# Patient Record
Sex: FEMALE | Race: AMERICAN INDIAN OR ALASKA NATIVE
[De-identification: names, ages, dates, MRNs, and addresses within clinical notes are randomized per-mention and may not be internally consistent; named-entity substitution may affect disease eponyms.]

---
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
## 2018-04-10 ENCOUNTER — HOSPITAL ENCOUNTER (EMERGENCY)
Dept: HOSPITAL 42 - ED | Age: 18
Discharge: LEFT BEFORE DISCHARGE | End: 2018-04-10
Payer: MEDICAID

## 2018-04-10 VITALS
RESPIRATION RATE: 16 BRPM | DIASTOLIC BLOOD PRESSURE: 76 MMHG | HEART RATE: 79 BPM | TEMPERATURE: 99 F | OXYGEN SATURATION: 99 % | SYSTOLIC BLOOD PRESSURE: 115 MMHG

## 2018-04-10 VITALS — BODY MASS INDEX: 29.5 KG/M2

## 2018-04-10 DIAGNOSIS — M54.5: Primary | ICD-10-CM

## 2018-07-07 ENCOUNTER — HOSPITAL ENCOUNTER (EMERGENCY)
Dept: HOSPITAL 42 - ED | Age: 18
Discharge: HOME | End: 2018-07-07
Payer: MEDICAID

## 2018-07-07 VITALS
DIASTOLIC BLOOD PRESSURE: 79 MMHG | RESPIRATION RATE: 12 BRPM | SYSTOLIC BLOOD PRESSURE: 113 MMHG | HEART RATE: 70 BPM | OXYGEN SATURATION: 99 %

## 2018-07-07 VITALS — BODY MASS INDEX: 29.5 KG/M2

## 2018-07-07 VITALS — TEMPERATURE: 97.9 F

## 2018-07-07 DIAGNOSIS — K29.70: Primary | ICD-10-CM

## 2018-07-07 LAB
ALBUMIN SERPL-MCNC: 4.5 G/DL
ALBUMIN/GLOB SERPL: 1.3 {RATIO}
ALT SERPL-CCNC: 26 U/L
APPEARANCE UR: (no result)
AST SERPL-CCNC: 23 U/L
BASOPHILS # BLD AUTO: 0.01 K/MM3
BASOPHILS NFR BLD: 0.2 %
BILIRUB UR-MCNC: NEGATIVE MG/DL
BUN SERPL-MCNC: 9 MG/DL
CALCIUM SERPL-MCNC: 9.2 MG/DL
COLOR UR: YELLOW
EOSINOPHIL # BLD: 0 10*3/UL
EOSINOPHIL NFR BLD: 0.4 %
ERYTHROCYTE [DISTWIDTH] IN BLOOD BY AUTOMATED COUNT: 12.8 %
GFR NON-AFRICAN AMERICAN: > 60
GLUCOSE UR STRIP-MCNC: NEGATIVE MG/DL
GRANULOCYTES # BLD: 3.52 10*3/UL
GRANULOCYTES NFR BLD: 68 %
HGB BLD-MCNC: 13.1 G/DL
LEUKOCYTE ESTERASE UR-ACNC: NEGATIVE LEU/UL
LIPASE SERPL-CCNC: 77 U/L
LYMPHOCYTES # BLD: 1.4 10*3/UL
LYMPHOCYTES NFR BLD AUTO: 26.4 %
MCH RBC QN AUTO: 29.4 PG
MCHC RBC AUTO-ENTMCNC: 34.4 G/DL
MCV RBC AUTO: 85.4 FL
MONOCYTES # BLD AUTO: 0.3 10*3/UL
MONOCYTES NFR BLD: 5 %
PH UR STRIP: 6 [PH]
PLATELET # BLD: 229 10^3/UL
PMV BLD AUTO: 11.1 FL
PROT UR STRIP-MCNC: NEGATIVE MG/DL
RBC # BLD AUTO: 4.46 10^6/UL
RBC # UR STRIP: NEGATIVE /UL
SP GR UR STRIP: >= 1.03
UROBILINOGEN UR STRIP-ACNC: 0.2 E.U./DL
WBC # BLD AUTO: 5.2 10^3/UL

## 2018-07-07 PROCEDURE — 83690 ASSAY OF LIPASE: CPT

## 2018-07-07 PROCEDURE — 87086 URINE CULTURE/COLONY COUNT: CPT

## 2018-07-07 PROCEDURE — 80053 COMPREHEN METABOLIC PANEL: CPT

## 2018-07-07 PROCEDURE — 83735 ASSAY OF MAGNESIUM: CPT

## 2018-07-07 PROCEDURE — 85025 COMPLETE CBC W/AUTO DIFF WBC: CPT

## 2018-07-07 PROCEDURE — 96374 THER/PROPH/DIAG INJ IV PUSH: CPT

## 2018-07-07 PROCEDURE — 96375 TX/PRO/DX INJ NEW DRUG ADDON: CPT

## 2018-07-07 PROCEDURE — 99283 EMERGENCY DEPT VISIT LOW MDM: CPT

## 2018-07-07 PROCEDURE — 81003 URINALYSIS AUTO W/O SCOPE: CPT

## 2018-07-07 NOTE — ED PDOC
Arrival/HPI





- General


Historian: Patient





- History of Present Illness


Time/Duration: 1-3 hours


Symptom Onset: Sudden


Activities at Onset: Rest


Context: Sitting





<Siddharth Branch - Last Filed: 18 06:00>





<Khurram Pro DO - Last Filed: 18 06:05>





- General


Chief Complaint: Abdominal Pain


Time Seen by Provider: 18 04:29





- History of Present Illness


Narrative History of Present Illness (Text): 





18 05:02





This is a 17 year old female with a PMH of , appendix removal, and IUD 

removal presents to the ER with abdominal pain that began a few hours ago after 

dinner. Patient was relaxing with friends when abdominal pain started and 

subsequently vomited several times and had diarrhea. Vomit and diarrhea were 

non bloody and non bilious. Patient states pain is similar to when she had 

appendicitis in . LMP was  and periods are regular. Patient admits 

to SOB, abdominal pain, vomiting, and diarrhea. Patient denies vaginal discharge

, vaginal bleeding, chest pain, dizziness, fevers and chills. PMD is Dr. Aguirre. 


 (Siddharth Branch)





Past Medical History





- Provider Review


Nursing Documentation Reviewed: Yes





- Past History


Past History: No Previous





- Cardiac


Hx Cardiac Disorders: No





- Pulmonary


Hx Respiratory Disorders: No





- Neurological


Hx Neurological Disorder: No





- HEENT


Hx HEENT Disorder: No





- Renal


Hx Renal Disorder: No





- Endocrine/Metabolic


Hx Endocrine Disorders: No





- Hematological/Oncological


Hx Blood Disorders: No





- Integumentary


Hx Dermatological Disorder: No





- Musculoskeletal/Rheumatological


Hx Musculoskeletal Disorders: No





- Gastrointestinal


Hx Gastrointestinal Disorders: No





- Genitourinary/Gynecological


Hx Genitourinary Disorders: No





- Psychiatric


Hx Psychophysiologic Disorder: No


Hx Substance Use: No





- Surgical History


Hx Appendectomy: Yes


Hx  Section: Yes


Other/Comment: hernia





<Siddharth Branch - Last Filed: 18 06:00>





Family/Social History





- Physician Review


Nursing Documentation Reviewed: Yes


Family/Social History: Unknown Family HX


Smoking Status: Never Smoked


Hx Alcohol Use: No


Hx Substance Use: No





<Siddharth Branch - Last Filed: 18 06:00>





Allergies/Home Meds





<Siddharth Branch - Last Filed: 18 06:00>





<Khurram Pro DO - Last Filed: 18 06:05>


Allergies/Adverse Reactions: 


Allergies





No Known Allergies Allergy (Verified 04/10/18 17:19)


 











Review of Systems





- Physician Review


All systems were reviewed & negative as marked: Yes





- Review of Systems


Constitutional: Normal.  absent: Fevers


Eyes: Normal


ENT: Normal


Respiratory: SOB.  absent: Cough, Wheezing


Cardiovascular: Normal.  absent: Chest Pain, Palpitations


Gastrointestinal: Abdominal Pain, Diarrhea, Nausea, Vomiting.  absent: 

Hematochezia, Hematemesis


Genitourinary Female: Normal.  absent: Hematuria, Vaginal Bleeding, Vaginal 

Discharge


Musculoskeletal: Normal


Skin: Normal


Neurological: Normal


Endocrine: Normal


Hemo/Lymphatic: Normal





<LynnloreeLakeshamelissa - Last Filed: 18 06:00>





Physical Exam


Vital Signs Reviewed: Yes


Temperature: Afebrile


Blood Pressure: Normal


Pulse: Regular


Respiratory Rate: Normal


Appearance: Positive for: Well-Appearing, Non-Toxic, Comfortable


Pain Distress: None


Mental Status: Positive for: Alert and Oriented X 3





- Systems Exam


Head: Present: Atraumatic, Normocephalic


Pupils: Present: PERRL


Extroacular Muscles: Present: EOMI


Conjunctiva: Present: Normal


Mouth: Present: Moist Mucous Membranes


Neck: Present: Normal Range of Motion


Respiratory/Chest: Present: Clear to Auscultation, Good Air Exchange.  No: 

Respiratory Distress, Accessory Muscle Use


Cardiovascular: Present: Regular Rate and Rhythm, Normal S1, S2.  No: Murmurs


Abdomen: Present: Tenderness, Normal Bowel Sounds.  No: Distention, Peritoneal 

Signs


Back: Present: Normal Inspection


Upper Extremity: Present: Normal Inspection.  No: Cyanosis, Edema


Lower Extremity: Present: Normal Inspection.  No: Edema


Neurological: Present: Speech Normal, Motor Func Grossly Intact, Normal Sensory 

Function


Skin: Present: Warm, Dry, Normal Color.  No: Rashes


Psychiatric: Present: Alert, Normal Insight, Normal Concentration





<LynnloreeSiddharth Yonatan Last Filed: 18 06:00>





Vital Signs











  Temp Pulse Resp BP Pulse Ox


 


 18 04:31  97.9 F  92 H  22  122/70  100











ED Course and Treatment: 





18 05:08





Impression: This is a 17 year old female presenting to ED with abdominal pain 

that began friday night and is associated with diarrhea and vomiting.





Plan:


-CBC, CMP


-Mg, Lipase


-Urine culture


-Pepcid, Zofran





Progress:


Pregnancy test is negative. 





18 06:00


Patient is resting comfortably; waiting for IV fluids to finish before discharge


 (Siddharth Branch)





- Lab Interpretations


Lab Results: 











 18 04:50 





 18 04:50 





 Lab Results





18 04:50: Urine Color Yellow, Urine Appearance Sl cloudy, Urine pH 6.0, 

Ur Specific Gravity >= 1.030, Urine Protein Negative, Urine Glucose (UA) 

Negative, Urine Ketones Negative, Urine Blood Negative, Urine Nitrate Negative, 

Urine Bilirubin Negative, Urine Urobilinogen 0.2, Ur Leukocyte Esterase Negative


18 04:50: Sodium 145, Potassium 4.2, Chloride 106, Carbon Dioxide 25, 

Anion Gap 17, BUN 9, Creatinine 0.5 L, Est GFR ( Amer) > 60, Est GFR (Non

-Af Amer) > 60, Random Glucose 115 H, Calcium 9.2, Magnesium 2.0, Total 

Bilirubin 0.3, AST 23, ALT 26, Alkaline Phosphatase 65, Total Protein 7.8, 

Albumin 4.5, Globulin 3.4, Albumin/Globulin Ratio 1.3, Lipase 77


18 04:50: WBC 5.2, RBC 4.46, Hgb 13.1, Hct 38.1, MCV 85.4, MCH 29.4, MCHC 

34.4, RDW 12.8, Plt Count 229, MPV 11.1 H, Gran % 68.0, Lymph % (Auto) 26.4, 

Mono % (Auto) 5.0, Eos % (Auto) 0.4 L, Baso % (Auto) 0.2, Gran # 3.52, Lymph # (

Auto) 1.4, Mono # (Auto) 0.3, Eos # (Auto) 0.0, Baso # (Auto) 0.01











- Medication Orders


Current Medication Orders: 














Discontinued Medications





Famotidine (Pepcid)  20 mg IVP STAT STA


   Stop: 18 04:59


   Last Admin: 18 05:07  Dose: 20 mg





IVP Administration


 Document     18 05:07  CNR  (Rec: 18 05:08  CNR  [facility] [id number]


     Charges for Administration


      # of IVP Administrations                   1





Sodium Chloride (Sodium Chloride 0.9%)  1,000 mls @ 1,000 mls/hr IV .Q1H STA


   Stop: 18 05:57


   Last Admin: 18 05:07  Dose: 1,000 mls/hr





eMAR Start Stop


 Document     18 05:07  CNR  (Rec: 18 05:07  CNR  [facility] [id number]


     Intravenous Solution


      Start Date                                 18


      Start Time                                 05:07





Ondansetron HCl (Zofran Inj)  4 mg IVP STAT STA


   Stop: 18 04:59


   Last Admin: 18 05:07  Dose: 4 mg





IVP Administration


 Document     18 05:07  CNR  (Rec: 18 05:07  CNR  [facility] [id number]


     Charges for Administration


      # of IVP Administrations                   1














- PA / NP / Resident Statement


ALEXA has reviewed & agrees with the documentation as recorded.


ALEXA has examined the patient and agrees with the treatment plan.





<Siddharth Branch - Last Filed: 18 06:00>





Disposition/Present on Arrival





- Present on Arrival


History of DVT/PE: No


History of Uncontrolled Diabetes: No


Urinary Catheter: No


History of Decub. Ulcer: No


History Surgical Site Infection Following: None





<Siddharth Branch - Last Filed: 18 06:00>





- Present on Arrival


Any Indicators Present on Arrival: No





- Disposition


Have Diagnosis and Disposition been Completed?: Yes


Disposition Time: 05:55





<Khurram Pro DO - Last Filed: 18 06:05>





- Disposition


Diagnosis: 


 Gastritis





Disposition: HOME/ ROUTINE


Condition: IMPROVED


Discharge Instructions (ExitCare):  Gastritis (DC)


Additional Instructions: 





WALTER MONIQUE, thank you for letting us take care of you today. Your provider 

was Khurram Pro DO and you were treated for STOMACH CRAMPS. The emergency 

medical care you received today was directed at your acute symptoms. If you 

were prescribed any medication, please fill it and take as directed. It may 

take several days for your symptoms to resolve. Return to the Emergency 

Department if your symptoms worsen, do not improve, or if you have any other 

problems.





Please contact your doctor or call one of the physicians/clinics you have been 

referred to that are listed on the Patient Visit Information form that is 

included in your discharge packet. Bring any paperwork you were given at 

discharge with you along with any medications you are taking to your follow up 

visit. Our treatment cannot replace ongoing medical care by a primary care 

provider outside of the emergency department.





Thank you for allowing the [facility] team to be part of your care today.

















Follow up with your primary care doctor in 2-3 days for re-evaluation and 

further management.


Prescriptions: 


Ondansetron ODT [Zofran ODT] 8 mg PO Q8 PRN #20 odt


 PRN Reason: Nausea/Vomiting


Ranitidine HCl [Zantac] 150 mg PO BID #20 tablet


Referrals: 


Willis Aguirre MD [Primary Care Provider] - Follow up with primary


Forms:  [employer] (English)

## 2018-12-08 ENCOUNTER — HOSPITAL ENCOUNTER (EMERGENCY)
Dept: HOSPITAL 31 - C.ER | Age: 18
Discharge: HOME | End: 2018-12-08
Payer: MEDICAID

## 2018-12-08 VITALS — DIASTOLIC BLOOD PRESSURE: 77 MMHG | TEMPERATURE: 98.7 F | HEART RATE: 77 BPM | SYSTOLIC BLOOD PRESSURE: 114 MMHG

## 2018-12-08 VITALS — RESPIRATION RATE: 18 BRPM

## 2018-12-08 VITALS — OXYGEN SATURATION: 97 %

## 2018-12-08 VITALS — BODY MASS INDEX: 29.5 KG/M2

## 2018-12-08 DIAGNOSIS — J03.90: Primary | ICD-10-CM

## 2018-12-08 NOTE — C.PDOC
Time Seen by Provider: 12/08/18 12:10


Chief Complaint (Nursing): Finger,Hand,&Wrist


History Per: Patient


History/Exam Limitations: no limitations


Onset/Duration Of Symptoms: Days


Current Symptoms Are (Timing): Still Present





PMH


Reviewed: Historical Data, Nursing Documentation, Vital Signs





- Medical History


PMH: No Chronic Diseases


   Denies: Neuro Disorder, HEENT Problems, GI Disorders, Resp Disorders, MS 

Disorders





- Surgical History


Surgical History: No Surg Hx





- Family History


Family History: States: No Known Family Hx





- Immunization History


Hx Tetanus Toxoid Vaccination: No


Hx Influenza Vaccination: No


Hx Pneumococcal Vaccination: No





ED Course And Treatment


O2 Sat by Pulse Oximetry: 97





Disposition





- Disposition

## 2018-12-08 NOTE — C.PDOC
History Of Present Illness


18 year old female presents to the emergency department with complaints of a 

sore throat since yesterday. Patient states that she noticed "black and white 

stuff" on her tonsils, and reports that she feels pain upon swallowing. patient 

also complains of right thumb pain which has been ongoing for some time, but was

aggravated yesterday. Patient reports pain upon moving her right thumb, she 

denies taking anything for the pain. 


Time Seen by Provider: 12/08/18 12:10


Chief Complaint (Nursing): Finger,Hand,&Wrist


History Per: Patient


History/Exam Limitations: None


Onset/Duration Of Symptoms: Days


Current Symptoms Are (Timing): Still Present


Quality (Mouth/Throat): Other (pain upon swallowing)


Symptoms Have Been: Continuous





Past Medical History


Reviewed: Historical Data, Nursing Documentation, Vital Signs


Vital Signs: 





                                Last Vital Signs











Temp  98.6 F   12/08/18 11:45


 


Pulse  75   12/08/18 11:45


 


Resp  18   12/08/18 11:45


 


BP  111/77   12/08/18 11:45


 


Pulse Ox  97   12/08/18 11:45














- Medical History


PMH: No Chronic Diseases


   Denies: Chronic Kidney Disease


Surgical History: Appendectomy


Family History: States: No Known Family Hx





- Social History


Hx Alcohol Use: No


Hx Substance Use: No





- Immunization History


Hx Tetanus Toxoid Vaccination: No


Hx Influenza Vaccination: No


Hx Pneumococcal Vaccination: No





Review Of Systems


Constitutional: Negative for: Fever, Chills


ENT: Positive for: Throat Pain


Musculoskeletal: Positive for: Hand Pain (right thumb)





Physical Exam





- Physical Exam


Appears: Well, Non-toxic, No Acute Distress


Skin: Normal Color, Warm, Dry


Head: Atraumatic, Normacephalic


Eye(s): bilateral: Normal Inspection, PERRL, EOMI


Ear(s): Bilateral: Normal


Nose: Normal


Oral Mucosa: Moist


Throat: Erythema, Exudate, No Drooling, No Mass, Other (bilateral tonsillar 

swelling)


Neck: Normal ROM


Chest: Symmetrical, No Tenderness


Cardiovascular: Rhythm Regular, No Murmur


Respiratory: Normal Breath Sounds, No Rales, No Rhonchi, No Wheezing


Extremity: No Normal ROM (ROM limited due to pain), No Tenderness


Extremity: Bilateral: Atraumatic, Normal Color And Temperature


Neurological/Psych: Oriented x3, Normal Speech





ED Course And Treatment


O2 Sat by Pulse Oximetry: 97 (RA)


Pulse Ox Interpretation: Normal





Medical Decision Making


Medical Decision Making: 


Plan:


Rapid Strep Group Antigen A





Results were negative. 


Patient was placed in thumb spica splint by CP. Patient remained afebrile and 

able to tolerate own secretions, speaking full sentences. Plan is to discharge 

home with Rx





Disposition


Counseled Patient/Family Regarding: Diagnosis, Need For Followup, Rx Given





- Disposition


Referrals: 


Price Woodson MD [Staff Provider] - 


Disposition: HOME/ ROUTINE


Disposition Time: 13:35


Condition: GOOD


Additional Instructions: 


Take antibiotic twice daily for 10 days for throat infection


Take Motrin for pain as needed and follow up with orthopedic


Prescriptions: 


Amoxicillin 875 mg PO BID #20 tablet


Ibuprofen [Motrin] 600 mg PO Q8 #30 tab


Instructions:  Sore Throat, Adult (DC)


Forms:  [facility] (English)





- POA


Present On Arrival: None





- Clinical Impression


Clinical Impression: 


 Tonsillitis








- PA / NP / Resident Statement


MD/DO has reviewed & agrees with the documentation as recorded.





- Scribe Statement


The provider has reviewed the documentation as recorded by the Scribe (Jose Beard)


All medical record entries made by the Scribe were at my direction and 

personally dictated by me. I have reviewed the chart and agree that the record 

accurately reflects my personal performance of the history, physical exam, 

medical decision making, and the department course for this patient. I have also

 personally directed, reviewed, and agree with the discharge instructions and 

disposition.

## 2018-12-21 ENCOUNTER — HOSPITAL ENCOUNTER (EMERGENCY)
Dept: HOSPITAL 31 - C.ER | Age: 18
Discharge: HOME | End: 2018-12-21
Payer: MEDICAID

## 2018-12-21 VITALS — BODY MASS INDEX: 31.8 KG/M2

## 2018-12-21 VITALS
HEART RATE: 80 BPM | SYSTOLIC BLOOD PRESSURE: 110 MMHG | TEMPERATURE: 98 F | DIASTOLIC BLOOD PRESSURE: 76 MMHG | OXYGEN SATURATION: 98 %

## 2018-12-21 VITALS — RESPIRATION RATE: 18 BRPM

## 2018-12-21 DIAGNOSIS — R10.2: Primary | ICD-10-CM

## 2018-12-21 LAB
BILIRUB UR-MCNC: NEGATIVE MG/DL
GLUCOSE UR STRIP-MCNC: NORMAL MG/DL
HCG,QUALITATIVE URINE: NEGATIVE
HYALINE CASTS #/AREA URNS LPF: (no result) /LPF (ref 0–2)
LEUKOCYTE ESTERASE UR-ACNC: (no result) LEU/UL
PH UR STRIP: 6 [PH] (ref 5–8)
PROT UR STRIP-MCNC: NEGATIVE MG/DL
RBC # UR STRIP: NEGATIVE /UL
SP GR UR STRIP: 1.03 (ref 1–1.03)
SQUAMOUS EPITHIAL: 3 /HPF (ref 0–5)
UROBILINOGEN UR-MCNC: 4 MG/DL (ref 0.2–1)

## 2018-12-21 NOTE — US
Date of service: 12/21/2018



HISTORY:

pelvic pain



COMPARISON:

None available



TECHNIQUE:

Real-time transabdominal pelvic ultrasound was performed. In addition 

a transvaginal pelvic ultrasound was necessary to better depict 

pelvic anatomy. 



FINDINGS:



UTERUS:

Measures 11.3 x 4.2 x 4.9 cm. Anteverted. 



ENDOMETRIUM:

Measures 8 mm in diameter. Trace fluid within the endometrial canal. 



CERVIX:

No cervical abnormality identified.



RIGHT OVARY:

Measures 3.4 x 2.3 x 2.6 cm. Blood flow is demonstrated.  



LEFT OVARY:

Measures 2.7 x 2.4 x 3.7 cm. Blood flow is demonstrated.  



FREE FLUID:

Small pelvic free fluid.



OTHER FINDINGS:

None. 



IMPRESSION:

Trace fluid within the endometrial canal. 



Small pelvic free fluid.

## 2018-12-21 NOTE — C.PDOC
History Of Present Illness


19 y/o F c Hx appendectomy p/w pelvic pain x 1 day. Describes pain as crampy, 

across lower abdomen, nonradiating, constant. Denies fever, chills, dyspnea, 

nausea, vomiting, vaginal bleeding, vaginal discharge, dysuria. States menstrual

period irregular, ended 12/10. Patient states she "googled" her symptoms and got

scared. She states she is sexually active with 1 partner only but does wish to 

be evaluated and treated for STDs.


Time Seen by Provider: 12/21/18 09:04


Chief Complaint (Nursing): Abdominal Pain





Past Medical History


Vital Signs: 





                                Last Vital Signs











Temp  98.5 F   12/21/18 08:38


 


Pulse  81   12/21/18 08:38


 


Resp  18   12/21/18 08:38


 


BP  104/72   12/21/18 08:38


 


Pulse Ox  95   12/21/18 08:38














- Medical History


PMH: 


   Denies: Chronic Kidney Disease


Surgical History: Appendectomy


Family History: States: No Known Family Hx





- Social History


Hx Alcohol Use: No


Hx Substance Use: No





- Immunization History


Hx Tetanus Toxoid Vaccination: No


Hx Influenza Vaccination: No


Hx Pneumococcal Vaccination: No





Review Of Systems


Except As Marked, All Systems Reviewed And Found Negative.


Constitutional: Negative for: Fever


Respiratory: Negative for: Shortness of Breath





Physical Exam





- Physical Exam


Additional Physical Exam Comments: 


Constitutional: No acute distress.


Head: Normocephalic. Atraumatic.


Eyes: PERRL.


ENT: Moist mucous membranes.


Neck: Supple.


Cardiovascular: Regular rate. Radial pulse 2+ bilaterally.


Chest: No tenderness.


Respiratory: Clear to auscultation bilaterally.


GI: Soft. Suprapubic Tenderness. Nondistended. No Guarding. No Rebound.


Back: No CVA tenderness.


Musculoskeletal: No tenderness or swelling of extremities.z


Skin: No rash.


Neurologic: Alert, no focal deficit.





ED Course And Treatment


O2 Sat by Pulse Oximetry: 95





Medical Decision Making


Medical Decision Making: 


Patient wished to not be treated for STDs and will await call for STD results if

positive. Otherwise, instructed to f/u OBGYN and instructed to return to ED for 

worsening pain, fever, vomiting, dyspnea, or any other problem.





Disposition





- Disposition


Referrals: 


Price Woodson MD [Staff Provider] - 


Disposition: HOME/ ROUTINE


Disposition Time: 11:28


Condition: STABLE


Additional Instructions: 


FINDINGS:





UTERUS:


Measures 11.3 x 4.2 x 4.9 cm. Anteverted. 





ENDOMETRIUM:


Measures 8 mm in diameter. Trace fluid within the endometrial canal. 





CERVIX:


No cervical abnormality identified.





RIGHT OVARY:


Measures 3.4 x 2.3 x 2.6 cm. Blood flow is demonstrated.  





LEFT OVARY:


Measures 2.7 x 2.4 x 3.7 cm. Blood flow is demonstrated.  





FREE FLUID:


Small pelvic free fluid.





OTHER FINDINGS:


None. 





IMPRESSION:


Trace fluid within the endometrial canal. 





Small pelvic free fluid.


Prescriptions: 


Ibuprofen [Motrin] 1 tab PO Q6 #30 tab


Instructions:  Acute Pelvic Pain


Forms:  [facility] (English)





- Clinical Impression


Clinical Impression: 


 Pelvic pain

## 2019-04-21 ENCOUNTER — HOSPITAL ENCOUNTER (EMERGENCY)
Dept: HOSPITAL 42 - ED | Age: 19
LOS: 1 days | Discharge: HOME | End: 2019-04-22
Payer: MEDICAID

## 2019-04-21 VITALS — BODY MASS INDEX: 31.8 KG/M2

## 2019-04-21 DIAGNOSIS — K52.9: Primary | ICD-10-CM

## 2019-04-21 PROCEDURE — 83735 ASSAY OF MAGNESIUM: CPT

## 2019-04-21 PROCEDURE — 85025 COMPLETE CBC W/AUTO DIFF WBC: CPT

## 2019-04-21 PROCEDURE — 80329 ANALGESICS NON-OPIOID 1 OR 2: CPT

## 2019-04-21 PROCEDURE — 83690 ASSAY OF LIPASE: CPT

## 2019-04-21 PROCEDURE — 80320 DRUG SCREEN QUANTALCOHOLS: CPT

## 2019-04-21 PROCEDURE — 96361 HYDRATE IV INFUSION ADD-ON: CPT

## 2019-04-21 PROCEDURE — 96374 THER/PROPH/DIAG INJ IV PUSH: CPT

## 2019-04-21 PROCEDURE — 80053 COMPREHEN METABOLIC PANEL: CPT

## 2019-04-21 PROCEDURE — 99284 EMERGENCY DEPT VISIT MOD MDM: CPT

## 2019-04-21 PROCEDURE — 84702 CHORIONIC GONADOTROPIN TEST: CPT

## 2019-04-21 PROCEDURE — 96375 TX/PRO/DX INJ NEW DRUG ADDON: CPT

## 2019-04-22 VITALS — TEMPERATURE: 97.8 F

## 2019-04-22 VITALS
OXYGEN SATURATION: 99 % | SYSTOLIC BLOOD PRESSURE: 118 MMHG | RESPIRATION RATE: 15 BRPM | DIASTOLIC BLOOD PRESSURE: 71 MMHG | HEART RATE: 82 BPM

## 2019-04-22 LAB
ALBUMIN SERPL-MCNC: 4.1 G/DL (ref 3–4.8)
ALBUMIN/GLOB SERPL: 1.3 {RATIO} (ref 1.1–1.8)
ALT SERPL-CCNC: 17 U/L (ref 7–56)
APAP SERPL-MCNC: < 10 UG/ML (ref 10–20)
AST SERPL-CCNC: 21 U/L (ref 14–36)
BASOPHILS # BLD AUTO: 0.01 K/MM3 (ref 0–2)
BASOPHILS NFR BLD: 0.1 % (ref 0–3)
BUN SERPL-MCNC: 10 MG/DL (ref 7–21)
CALCIUM SERPL-MCNC: 9 MG/DL (ref 8.4–10.5)
EOSINOPHIL # BLD: 0 10*3/UL (ref 0–0.7)
EOSINOPHIL NFR BLD: 0.2 % (ref 1.5–5)
ERYTHROCYTE [DISTWIDTH] IN BLOOD BY AUTOMATED COUNT: 13.1 % (ref 11.5–14.5)
GFR NON-AFRICAN AMERICAN: > 60
HGB BLD-MCNC: 11.7 G/DL (ref 12–16)
LIPASE SERPL-CCNC: 31 U/L (ref 23–300)
LYMPHOCYTES # BLD: 1.6 10*3/UL (ref 1.2–3.4)
LYMPHOCYTES NFR BLD AUTO: 16 % (ref 22–35)
MCH RBC QN AUTO: 28.1 PG (ref 25–35)
MCHC RBC AUTO-ENTMCNC: 32.6 G/DL (ref 31–37)
MCV RBC AUTO: 86.1 FL (ref 80–105)
MONOCYTES # BLD AUTO: 0.4 10*3/UL (ref 0.1–0.6)
MONOCYTES NFR BLD: 3.8 % (ref 1–6)
PLATELET # BLD: 192 10^3/UL (ref 120–450)
PMV BLD AUTO: 10.9 FL (ref 7–11)
RBC # BLD AUTO: 4.17 10^6/UL (ref 3.5–6.1)
SALICYLATES SERPL-MCNC: < 1 MG/DL (ref 2–20)
WBC # BLD AUTO: 10.1 10^3/UL (ref 4.5–11)

## 2019-04-22 NOTE — ED PDOC
Arrival/HPI





<Cr Johnston - Last Filed: 04/22/19 00:42>





- General


Historian: Patient





- History of Present Illness


Narrative History of Present Illness (Text): 


04/22/19 00:11


Dejah Vasquez is a 18 year old female, whose past medical history includes 

appendectomy, who presents to the ED brought in by EMS accompanied by parent 

complaining of nausea and vomiting for the past 2 hours after eating out and 

eating a brownie. Patient denies any fever, chills, vaginal bleeding, vaginal 

discharge, or any other complaints.


Time/Duration: 1-3 hours


Symptom Onset: Gradual


Symptom Course: Unchanged


Activities at Onset: Light


Context: Home





<Gen Guardado - Last Filed: 04/22/19 02:47>





- General


Chief Complaint: GI Problem





Past Medical History





- Provider Review


Nursing Documentation Reviewed: Yes





- Past History


Past History: No Previous





- Infectious Disease


Hx of Infectious Diseases: None





- Cardiac


Hx Cardiac Disorders: No





- Pulmonary


Hx Respiratory Disorders: No





- Neurological


Hx Neurological Disorder: No





- HEENT


Hx HEENT Disorder: No





- Renal


Hx Renal Disorder: No





- Endocrine/Metabolic


Hx Endocrine Disorders: No





- Hematological/Oncological


Hx Blood Disorders: No





- Integumentary


Hx Dermatological Disorder: No





- Musculoskeletal/Rheumatological


Hx Musculoskeletal Disorders: No





- Gastrointestinal


Hx Gastrointestinal Disorders: No





- Genitourinary/Gynecological


Hx Genitourinary Disorders: No





- Psychiatric


Hx Psychophysiologic Disorder: No


Hx Substance Use: No





- Surgical History


Hx Appendectomy: Yes





- Anesthesia


Hx Anesthesia: Yes





<Gen Guardado - Last Filed: 04/22/19 02:47>





Family/Social History





- Physician Review


Nursing Documentation Reviewed: Yes


Family/Social History: Unknown Family HX


Smoking Status: Never Smoked


Hx Alcohol Use: No


Hx Substance Use: No





<Gen Guardado - Last Filed: 04/22/19 02:47>





Allergies/Home Meds





<Cr Johnston - Last Filed: 04/22/19 00:42>





<Gen Guardado - Last Filed: 04/22/19 02:47>


Allergies/Adverse Reactions: 


Allergies





No Known Allergies Allergy (Verified 12/21/18 08:38)


   











Review of Systems





- Physician Review


All systems were reviewed & negative as marked: Yes





- Review of Systems


Constitutional: absent: Fatigue, Weight Change, Fevers


Eyes: absent: Vision Changes


ENT: absent: Hearing Changes


Respiratory: absent: SOB, Cough


Cardiovascular: absent: Chest Pain


Gastrointestinal: Normal.  absent: Abdominal Pain, Nausea, Vomiting


Genitourinary Female: absent: Vaginal Bleeding


Musculoskeletal: absent: Back Pain


Skin: absent: Rash, Pruritis


Neurological: absent: Headache, Dizziness


Psychiatric: absent: Anxiety, Depression, Suicidal Ideation





<Gen Guardado - Last Filed: 04/22/19 02:47>





Physical Exam


Vital Signs Reviewed: Yes


Temperature: Afebrile


Blood Pressure: Normal


Pulse: Regular


Respiratory Rate: Normal


Appearance: Positive for: Well-Appearing, Non-Toxic, Comfortable


Pain Distress: None


Mental Status: Positive for: Alert and Oriented X 3





- Systems Exam


Head: Present: Atraumatic, Normocephalic


Pupils: Present: PERRL


Extroacular Muscles: Present: EOMI


Conjunctiva: Present: Normal


Mouth: Present: Moist Mucous Membranes


Neck: Present: Normal Range of Motion


Respiratory/Chest: Present: Clear to Auscultation, Good Air Exchange.  No: 

Respiratory Distress, Accessory Muscle Use


Cardiovascular: Present: Regular Rate and Rhythm, Normal S1, S2.  No: Murmurs


Abdomen: No: Tenderness, Distention, Peritoneal Signs


Back: Present: Normal Inspection.  No: CVA Tenderness, Midline Tenderness, 

Paraspinal Tenderness


Upper Extremity: Present: Normal Inspection.  No: Cyanosis, Edema


Lower Extremity: Present: Normal Inspection.  No: Edema


Neurological: Present: GCS=15, CN II-XII Intact, Speech Normal, Motor Func 

Grossly Intact, Normal Cerebellar Funct, Gait Normal, Memory Normal


Skin: Present: Warm, Dry, Normal Color.  No: Rashes


Psychiatric: Present: Alert, Oriented x 3, Normal Insight, Normal Concentration





<Gen Guardado - Last Filed: 04/22/19 02:47>





Medical Decision Making





- Medication Orders


Current Medication Orders: 











Sodium Chloride (Sodium Chloride 0.9%)  1,000 mls @ 999 mls/hr IV .Q1H1M STA


   Stop: 04/22/19 01:11





Discontinued Medications





Famotidine (Pepcid)  20 mg IVP STAT STA


   Stop: 04/22/19 00:12


Metoclopramide HCl (Reglan)  10 mg IVP STAT STA


   Stop: 04/22/19 00:12











<Cr Johnston - Last Filed: 04/22/19 00:42>


ED Course and Treatment: 


04/22/19 00:11


Impression:


18 year old female complaining of nausea and vomiting for the past 2 hours.





Plan:


-- CBC, CMP, lipase, alcohol level


-- Urinalysis, urine drug screen


-- IV fluids


-- Reglan


-- Pepcid


-- Reassess and disposition





Prior Visits:


Notes and results from previous visits were reviewed. 





Progress Notes:





04/22/19 02:47


-HCG is negative


-Labs are nonsignificant


-Pt. feels completely relief, feeling much better, no abdominal pain, no 

abdominal tenderness or distension. 


-Discharge home with pepcid, zofran, avoid alcohol and same brownie/food again, 

follow up with your own pmd and GI within 2 days, return to the ER for any new 

or worsening signs or symptoms





<Gen Guardado - Last Filed: 04/22/19 02:47>





- PA / NP / Resident Statement


ALEXA has reviewed & agrees with the documentation as recorded.





<Cr Johnston - Last Filed: 04/22/19 00:42>





- PA / NP / Resident Statement


ALEXA has reviewed & agrees with the documentation as recorded.





- Scribe Statement


The provider has reviewed the documentation as recorded by the Tami Monique





Provider Scribe Attestation:


All medical record entries made by the Tami were at my direction and 

personally dictated by me. I have reviewed the chart and agree that the record 

accurately reflects my personal performance of the history, physical exam, 

medical decision making, and the department course for this patient. I have also

personally directed, reviewed, and agree with the discharge instructions and 

disposition.








<Gen Guardado - Last Filed: 04/22/19 02:47>





Disposition/Present on Arrival





<Cr Johnston - Last Filed: 04/22/19 00:42>





- Present on Arrival


Any Indicators Present on Arrival: No


History of DVT/PE: No


History of Uncontrolled Diabetes: No


Urinary Catheter: No


History of Decub. Ulcer: No


History Surgical Site Infection Following: None





- Disposition


Have Diagnosis and Disposition been Completed?: Yes


Disposition Time: 02:47


Patient Plan: Discharge





<Gen Guardado - Last Filed: 04/22/19 02:47>





- Disposition


Diagnosis: 


 Gastroenteritis





Disposition: HOME/ ROUTINE


Patient Problems: 


                             Current Active Problems











Problem Status Onset


 


Gastroenteritis Acute 











Condition: IMPROVED


Additional Instructions: 


Discharge home with pepcid, zofran, avoid alcohol and same brownie/food again, 

follow up with your own pmd and GI within 2 days, return to the ER for any new 

or worsening signs or symptoms


Prescriptions: 


Famotidine [Pepcid] 20 mg PO BID #20 tab


Ondansetron [Zofran] 4 mg PO Q8H PRN #10 tab


 PRN Reason: Nausea/Vomiting


Referrals: 


Willis Aguirre MD [Primary Care Provider] - Follow up with primary


Otf Rollins MD [Staff Provider] - Follow up with primary


Forms:  [facility] (English), WORK NOTE

## 2021-07-04 NOTE — ED PDOC
Arrival/HPI





- General


Chief Complaint: Abdominal Pain


Time Seen by Provider: 04/10/18 18:25


Historian: Patient





- History of Present Illness


Narrative History of Present Illness (Text): 


04/10/18 19:17


A 17 year old female, with no significant past medical history, presents to the 

emergency department complaining of periodic abdominal cramping for past 5 

months. Patient reports currently being sexually active. States having some 

spotting from time to time. Patient notes also experiencing constant lower back 

pain (left > right). Patient has appointment to see OB/GYN in 3 days, however 

decided to speed up process and arrived here in the ER for evaluation. Denies 

any abdominal pain (except after intercourse), nausea, vomiting, diarrhea, fever

, chest pain, shortness of breath, or any other complaints.





PMD: Dr. Aguirre











Time/Duration: > month


Symptom Onset: Gradual


Symptom Course: Unchanged


Quality: Cramping


Severity Level: 4


Activities at Onset: Rest, Light, Sleeping


Context: Home, Work





Past Medical History





- Provider Review


Nursing Documentation Reviewed: Yes





- Travel History


Have you recently traveled outside [place] w/in the past 3 mons?: No





- Past History


Past History: No Previous





- Psychiatric


Hx Psychophysiologic Disorder: No


Hx Substance Use: No





- Surgical History


Hx Appendectomy: Yes


Hx  Section: Yes


Other/Comment: hernia





Family/Social History





- Physician Review


Nursing Documentation Reviewed: Yes


Family/Social History: No Known Family HX


Smoking Status: Never Smoked


Hx Alcohol Use: No


Hx Substance Use: No





Allergies/Home Meds


Allergies/Adverse Reactions: 


Allergies





No Known Allergies Allergy (Verified 04/10/18 17:19)


 








Home Medications: 


 Home Meds











 Medication  Instructions  Recorded  Confirmed


 


No Known Home Med  04/10/18 04/10/18














Review of Systems





- Physician Review


All systems were reviewed & negative as marked: Yes





- Review of Systems


Constitutional: absent: Fevers


Eyes: Normal


ENT: Normal


Respiratory: absent: SOB, Cough


Cardiovascular: absent: Chest Pain


Gastrointestinal: absent: Abdominal Pain (except after sexual intercourse; 

experiences abdominal cramping), Diarrhea, Nausea, Vomiting


Genitourinary Female: Other (spotting)


Musculoskeletal: Back Pain (lower back pain (left > right))


Skin: Normal


Neurological: Normal


Endocrine: Normal


Hemo/Lymphatic: Normal


Psychiatric: Normal





Physical Exam


Vital Signs Reviewed: Yes


Vital Signs











  Temp Pulse Resp BP Pulse Ox


 


 04/10/18 17:20  99.0 F  79  16  115/76  99











Temperature: Afebrile


Blood Pressure: Normal


Pulse: Regular


Respiratory Rate: Normal


Appearance: Positive for: Well-Appearing


Pain Distress: None


Mental Status: Positive for: Alert and Oriented X 3





- Systems Exam


Head: Present: Atraumatic, Normocephalic


Pupils: Present: PERRL


Extroacular Muscles: Present: EOMI


Conjunctiva: Present: Normal


Mouth: Present: Moist Mucous Membranes


Neck: Present: Normal Range of Motion


Respiratory/Chest: Present: Clear to Auscultation, Good Air Exchange.  No: 

Respiratory Distress, Accessory Muscle Use


Cardiovascular: Present: Regular Rate and Rhythm, Normal S1, S2.  No: Murmurs


Abdomen: No: Tenderness, Distention, Peritoneal Signs


Back: Present: CVA Tenderness (left-side, refers across right side on lumbar 

region)


Upper Extremity: Present: Normal Inspection.  No: Cyanosis, Edema


Lower Extremity: Present: Normal Inspection.  No: Edema


Neurological: Present: GCS=15, CN II-XII Intact, Speech Normal


Skin: Present: Warm, Dry, Normal Color.  No: Rashes


Psychiatric: Present: Alert, Oriented x 3, Normal Insight, Normal Concentration





Medical Decision Making


ED Course and Treatment: 


04/10/18 19:24


Impression: 17 year old female with abdominal cramping. Physical exam shows 

left CVA tenderness, going across to right side in lumbar region; rest of 

examination is benign.





Working Dx: UTI, renal or uretal stone, fibroid





Plan:


-- Labs


-- Urinalysis 


-- Reassess and disposition





Progress Notes:


Pt eloped before workup done























- Scribe Statement


The provider has reviewed the documentation as recorded by the Tami Glasgow





Provider Scribe Attestation:


All medical record entries made by the Scribe were at my direction and 

personally dictated by me. I have reviewed the chart and agree that the record 

accurately reflects my personal performance of the history, physical exam, 

medical decision making, and the department course for this patient. I have 

also personally directed, reviewed, and agree with the discharge instructions 

and disposition.








Disposition/Present on Arrival





- Present on Arrival


Any Indicators Present on Arrival: Yes


History of DVT/PE: No


History of Uncontrolled Diabetes: No


Urinary Catheter: No


History of Decub. Ulcer: No


History Surgical Site Infection Following: None





- Disposition


Have Diagnosis and Disposition been Completed?: Yes


Diagnosis: 


 Low back pain





Disposition: ELOPEMENT - ER ONLY


Disposition Time: 18:57


Patient Plan: Other (eloped)


Condition: STABLE


Referrals: 


Willis Aguirre MD [Primary Care Provider] - Follow up with primary


Forms:  [facility] (English)
Initial (On Arrival)